# Patient Record
Sex: MALE | Race: WHITE | NOT HISPANIC OR LATINO | Employment: FULL TIME | URBAN - METROPOLITAN AREA
[De-identification: names, ages, dates, MRNs, and addresses within clinical notes are randomized per-mention and may not be internally consistent; named-entity substitution may affect disease eponyms.]

---

## 2017-09-25 ENCOUNTER — PRE VISIT (OUTPATIENT)
Dept: UROLOGY | Facility: CLINIC | Age: 32
End: 2017-09-25

## 2017-09-27 ENCOUNTER — OFFICE VISIT (OUTPATIENT)
Dept: UROLOGY | Facility: CLINIC | Age: 32
End: 2017-09-27
Payer: COMMERCIAL

## 2017-09-27 VITALS
DIASTOLIC BLOOD PRESSURE: 76 MMHG | SYSTOLIC BLOOD PRESSURE: 125 MMHG | OXYGEN SATURATION: 100 % | TEMPERATURE: 97 F | HEART RATE: 48 BPM

## 2017-09-27 DIAGNOSIS — I86.1 BILATERAL VARICOCELES: Primary | ICD-10-CM

## 2017-09-27 PROCEDURE — 99202 OFFICE O/P NEW SF 15 MIN: CPT | Performed by: UROLOGY

## 2017-09-27 ASSESSMENT — PAIN SCALES - GENERAL: PAINLEVEL: NO PAIN (0)

## 2017-09-27 NOTE — PROGRESS NOTES
Pt here for follow-up.  Was last seen August 2014, a little over 3 years ago.  They did IVF and had a child, and she was pregnant Aug 2015.  They did a few IUI prior.    They are here to discuss IVF versus varicocele repair.    Semen analyses x 2 done and results reviewed in depth.  Semen Analysis at [facility]:  3ml,  pH 7.8, 4.3M/cc, 47% progressive, 36% morphology (>30%), Total Progressive Motile Count: 6.06 Million.        Component      Latest Ref Rng & Units 8/15/2014 12/26/2014   Collection Method        Masturbation   Collection Site        [facility] [place] [facility]   Specimen Type        Semen   Lab Receipt Time        816   Time of Analysis        840   Analysis Temp - Centigrade      centigrade  22   Abstinence days      2 - 7 days  6   Liquefied      yes/no  Y   Viscous      yes/no  N   Agglutination      yes/no  N   pH      7.2 pH  7.6   Volume      1.5 ml  3.6   Concentration      15 million/ml  21.5   Total Number      39 million  77.4   Progressive motility      32 %  73   Non-progressive motility      %  2   Immotile      %  25   Total Progressive Motile      15.6 million  56.5   Vitality      58 %  NA   Normal Sperm      4 % normal forms  1 (A)   Abnormal Sperm      morphology  99   Head Defect        99   Midpiece Defect        48   Tail Defect        6   Round Cells      2 million/ml  0.9   WBC      %  NA   Immature Sperm      %  NA   Cell Fragments      %  NA   Consent to Release to Partner        Received 12/26/2014.   Testosterone Total      240 - 950 ng/dL 530    FSH      1.4 - 18.1 IU/L 5.8      Bilateral grade II varicocele, both small grade II.  18cc bilaterally.        He declines a scrotal ultrasound to confirm bilateral varicoceles at this time.  We'll get repeat baseline testing with labs and semen analysis with CAP-score.    I will contact him with results.    Discussed risks, benefits, and alternatives of varix repair, including various methods.  I counseled him extensively on the nature of  varicoceles, and their possible effects on testosterone production and fertility.  I described surgery and embolization approaches, and the detailed risks of surgical repair.  These include damage to artery (ischemia), damage to lymphatics ( hydrocele), as well as risk of recurrence (</= 1%). Discussed that about 2/3rds of men see improved semen parameters and that improvement takes at least 3 months to see. Discussed that if men have varicocele pain, this typically improves after varicocele repair but in rare cases men may have some new testis sensitivity that may be very bothersome.    25min visit, over 50% face to face in counseling/discussion of above issues.

## 2017-09-27 NOTE — MR AVS SNAPSHOT
After Visit Summary   9/27/2017    Pete Valente    MRN: [id number]           Patient Information     Date Of Birth          1985        Visit Information        Provider Department      9/27/2017 2:30 PM Petros Sierra MD [facility]        Today's Diagnoses     Varicocele    -  1      Care Instructions    Semen Analysis (Strict Morph). For the sample the patient needs to abstain from ejaculation for 2-5 days prior, and the sample should be collected in clinic. Ejaculating too frequently can deplete the count whereas abstaining for long periods of time can decrease the number of live sperm. No lubrication, powders, saliva, or intercourse can be used. Partners can be in the room and help produce the sample.     [facility]  [facility]  [address]  [address] [address]  [place], [place] [address] [phone]          Follow-ups after your visit        Your next 10 appointments already scheduled     Oct 19, 2017  8:00 AM CDT   LAB with [facility] LAB   [facility] ([facility])    [address]  [facility] [address] [phone]           Patient must bring picture ID. Patient should be prepared to give a urine specimen  Please do not eat 10-12 hours before your appointment if you are coming in fasting for labs on lipids, cholesterol, or glucose (sugar). Pregnant women should follow their Care Team instructions. Water with medications is okay. Do not drink coffee or other fluids. If you have concerns about taking  your medications, please ask at office or if scheduling via [employer], send a message by clicking on Secure Messaging, Message Your Care Team.              Future tests that were ordered for you today     Open Future Orders        Priority Expected Expires Ordered    Semen Analysis, Strict Morphology (BHAVYA) Routine  9/28/2018 9/27/2017    Testosterone Free and Total Routine  11/27/2017  2017    Follicle stimulating hormone Routine  2017    Estradiol ultrasensitive Routine  2017            Who to contact     If you have questions or need follow up information about today's clinic visit or your schedule please contact [facility] directly at [phone].  Normal or non-critical lab and imaging results will be communicated to you by MyChart, letter or phone within 4 business days after the clinic has received the results. If you do not hear from us within 7 days, please contact the clinic through MyChart or phone. If you have a critical or abnormal lab result, we will notify you by phone as soon as possible.  Submit refill requests through [employer] or call your pharmacy and they will forward the refill request to us. Please allow 3 business days for your refill to be completed.          Additional Information About Your Visit        [employer] Information     [employer] is an electronic gateway that provides easy, online access to your medical records. With [employer], you can request a clinic appointment, read your test results, renew a prescription or communicate with your care team.     To sign up for [employer] visit the website at [email or web address]   You will be asked to enter the access code listed below, as well as some personal information. Please follow the directions to create your username and password.     Your access code is: [email or web address]  Expires: 2017  3:31 PM     Your access code will  in 90 days. If you need help or a new code, please contact your [facility] or call [phone] for assistance.        Care EveryWhere ID     This is your Care EveryWhere ID. This could be used by other organizations to access your [place] medical records  [id number]        Your Vitals Were     Pulse Temperature Pulse Oximetry             48 97  F (36.1  C) (Oral) 100%          Blood Pressure from Last 3  Encounters:   09/27/17 125/76   08/22/16 120/80   01/24/16 142/82    Weight from Last 3 Encounters:   08/22/16 88.5 kg (195 lb)   01/19/16 88.5 kg (195 lb)   06/18/15 88.5 kg (195 lb)               Primary Care Provider    Physician No Ref-Primary       No address on file        Equal Access to Services     [facility] : Hadii aad ku hadasho Soomaali, waaxda luqadaha, qaybta kaalmada adeegyada, waxay idiin hayaan adeeg khtheresash larob . So [facility] [phone].    ATENCIÓN: Si habla español, tiene a benito disposición servicios gratuitos de asistencia lingüística. Llame al [phone].    We comply with applicable federal civil rights laws and [place] laws. We do not discriminate on the basis of race, color, national origin, age, disability sex, sexual orientation or gender identity.            Thank you!     Thank you for choosing [facility] [[email or web address]]  for your care. Our goal is always to provide you with excellent care. Hearing back from our patients is one way we can continue to improve our services. Please take a few minutes to complete the written survey that you may receive in the mail after your visit with us. Thank you!             Your Updated Medication List - Protect others around you: Learn how to safely use, store and throw away your medicines at www.disposemymeds.org.          This list is accurate as of: 9/27/17  3:31 PM.  Always use your most recent med list.                   Brand Name Dispense Instructions for use Diagnosis    ADVIL PO

## 2017-09-27 NOTE — PATIENT INSTRUCTIONS
Semen Analysis (Strict Morph). For the sample the patient needs to abstain from ejaculation for 2-5 days prior, and the sample should be collected in clinic. Ejaculating too frequently can deplete the count whereas abstaining for long periods of time can decrease the number of live sperm. No lubrication, powders, saliva, or intercourse can be used. Partners can be in the room and help produce the sample.     [facility]  [facility]  [address]  [address] [place] [address]  [phone]

## 2017-09-27 NOTE — NURSING NOTE
"Pete Valente's goals for this visit include:   Chief Complaint   Patient presents with     Consult     Varicocele        He requests these members of his care team be copied on today's visit information: NA     Referring Provider:  Referred Self, MD  No address on file    Initial /76 (BP Location: Left arm, Patient Position: Chair, Cuff Size: Adult Regular)  Pulse (!) 48  Temp 97  F (36.1  C) (Oral)  SpO2 100% Estimated body mass index is 25.04 kg/(m^2) as calculated from the following:    Height as of 1/24/16: 1.88 m (6' 2\").    Weight as of 8/22/16: 88.5 kg (195 lb).  BP completed using cuff size: regular        "

## 2017-10-19 LAB — FSH SERPL-ACNC: 6 IU/L (ref 0.7–10.8)

## 2017-10-19 PROCEDURE — 84270 ASSAY OF SEX HORMONE GLOBUL: CPT | Performed by: UROLOGY

## 2017-10-19 PROCEDURE — 82670 ASSAY OF TOTAL ESTRADIOL: CPT | Performed by: UROLOGY

## 2017-10-19 PROCEDURE — 84403 ASSAY OF TOTAL TESTOSTERONE: CPT | Performed by: UROLOGY

## 2017-10-19 PROCEDURE — 36415 COLL VENOUS BLD VENIPUNCTURE: CPT | Performed by: UROLOGY

## 2017-10-19 PROCEDURE — 83001 ASSAY OF GONADOTROPIN (FSH): CPT | Performed by: UROLOGY

## 2017-10-21 LAB
SHBG SERPL-SCNC: 46 NMOL/L (ref 11–80)
TESTOST FREE SERPL-MCNC: 9.22 NG/DL (ref 4.7–24.4)
TESTOST SERPL-MCNC: 538 NG/DL (ref 240–950)

## 2017-10-24 LAB — ESTRADIOL SERPL HS-MCNC: 17 PG/ML (ref 10–40)

## 2017-12-27 ENCOUNTER — TRANSFERRED RECORDS (OUTPATIENT)
Dept: HEALTH INFORMATION MANAGEMENT | Facility: CLINIC | Age: 32
End: 2017-12-27

## 2017-12-27 PROCEDURE — 89322 SEMEN ANAL STRICT CRITERIA: CPT

## 2018-01-02 LAB
ABNORMAL SPERM: 87 MORPHOLOGY
ABSTINENCE DAYS: 4 DAYS (ref 2–7)
AGGLUTINATION: NO YES/NO
ANALYSIS TEMP - CENTIGRADE: 22 CENTIGRADE
CELL FRAGMENTS: NORMAL %
COLLECTION METHOD: NORMAL
COLLECTION SITE: NORMAL
CONSENT TO RELEASE TO PARTNER: YES
HEAD DEFECT: 84
IMMATURE SPERM: NORMAL %
IMMOTILE: 20 %
LAB RECEIPT TIME: NORMAL
LIQUEFIED: YES YES/NO
MIDPIECE DEFECT: 26
NON-PROGRESSIVE MOTILITY: 6 %
NORMAL SPERM: 13 % NORMAL FORMS (ref 4–?)
PROGRESSIVE MOTILITY: 74 % (ref 32–?)
ROUND CELLS: 0.2 MILLION/ML (ref ?–2)
SPECIMEN CONCENTRATION: 30 MILLION/ML (ref 15–?)
SPECIMEN PH: 7.6 PH (ref 7.2–?)
SPECIMEN TYPE: NORMAL
SPECIMEN VOL UR: 3.4 ML (ref 1.5–?)
TAIL DEFECT: 5
TIME OF ANALYSIS: NORMAL
TOTAL NUMBER: 102 MILLION (ref 39–?)
TOTAL PROGRESSIVE MOTILE: 75 MILLION (ref 15.6–?)
VISCOUS: NO YES/NO
VITALITY: NORMAL % (ref 58–?)
WBC SPECIMEN: NORMAL %

## 2019-12-15 ENCOUNTER — HEALTH MAINTENANCE LETTER (OUTPATIENT)
Age: 34
End: 2019-12-15

## 2021-01-15 ENCOUNTER — HEALTH MAINTENANCE LETTER (OUTPATIENT)
Age: 36
End: 2021-01-15

## 2021-10-24 ENCOUNTER — HEALTH MAINTENANCE LETTER (OUTPATIENT)
Age: 36
End: 2021-10-24

## 2022-02-13 ENCOUNTER — HEALTH MAINTENANCE LETTER (OUTPATIENT)
Age: 37
End: 2022-02-13

## 2022-10-16 ENCOUNTER — HEALTH MAINTENANCE LETTER (OUTPATIENT)
Age: 37
End: 2022-10-16

## 2022-12-03 ENCOUNTER — HOSPITAL ENCOUNTER (EMERGENCY)
Facility: CLINIC | Age: 37
Discharge: HOME | End: 2022-12-04
Attending: EMERGENCY MEDICINE | Admitting: EMERGENCY MEDICINE
Payer: COMMERCIAL

## 2022-12-03 DIAGNOSIS — N17.9 ACUTE KIDNEY INJURY (H): ICD-10-CM

## 2022-12-03 DIAGNOSIS — N20.0 KIDNEY STONE: ICD-10-CM

## 2022-12-03 PROCEDURE — 96360 HYDRATION IV INFUSION INIT: CPT

## 2022-12-03 PROCEDURE — 81003 URINALYSIS AUTO W/O SCOPE: CPT | Performed by: EMERGENCY MEDICINE

## 2022-12-03 PROCEDURE — 99284 EMERGENCY DEPT VISIT MOD MDM: CPT | Mod: 25

## 2022-12-04 ENCOUNTER — APPOINTMENT (OUTPATIENT)
Dept: CT IMAGING | Facility: CLINIC | Age: 37
End: 2022-12-04
Attending: EMERGENCY MEDICINE
Payer: COMMERCIAL

## 2022-12-04 VITALS
SYSTOLIC BLOOD PRESSURE: 131 MMHG | OXYGEN SATURATION: 100 % | TEMPERATURE: 99.6 F | RESPIRATION RATE: 20 BRPM | DIASTOLIC BLOOD PRESSURE: 83 MMHG | HEART RATE: 86 BPM

## 2022-12-04 LAB
ALBUMIN UR-MCNC: NEGATIVE MG/DL
ANION GAP SERPL CALCULATED.3IONS-SCNC: 11 MMOL/L (ref 7–15)
APPEARANCE UR: CLEAR
BASOPHILS # BLD AUTO: 0.1 10E3/UL (ref 0–0.2)
BASOPHILS NFR BLD AUTO: 1 %
BILIRUB UR QL STRIP: NEGATIVE
BUN SERPL-MCNC: 20.1 MG/DL (ref 6–20)
CALCIUM SERPL-MCNC: 9.3 MG/DL (ref 8.6–10)
CHLORIDE SERPL-SCNC: 101 MMOL/L (ref 98–107)
COLOR UR AUTO: NORMAL
CREAT SERPL-MCNC: 1.43 MG/DL (ref 0.67–1.17)
DEPRECATED HCO3 PLAS-SCNC: 24 MMOL/L (ref 22–29)
EOSINOPHIL # BLD AUTO: 0.1 10E3/UL (ref 0–0.7)
EOSINOPHIL NFR BLD AUTO: 1 %
ERYTHROCYTE [DISTWIDTH] IN BLOOD BY AUTOMATED COUNT: 11.5 % (ref 10–15)
GFR SERPL CREATININE-BSD FRML MDRD: 65 ML/MIN/1.73M2
GLUCOSE SERPL-MCNC: 116 MG/DL (ref 70–99)
GLUCOSE UR STRIP-MCNC: NEGATIVE MG/DL
HCT VFR BLD AUTO: 43 % (ref 40–53)
HGB BLD-MCNC: 14.4 G/DL (ref 13.3–17.7)
HGB UR QL STRIP: NEGATIVE
IMM GRANULOCYTES # BLD: 0 10E3/UL
IMM GRANULOCYTES NFR BLD: 0 %
KETONES UR STRIP-MCNC: NEGATIVE MG/DL
LEUKOCYTE ESTERASE UR QL STRIP: NEGATIVE
LYMPHOCYTES # BLD AUTO: 1.9 10E3/UL (ref 0.8–5.3)
LYMPHOCYTES NFR BLD AUTO: 21 %
MCH RBC QN AUTO: 28.9 PG (ref 26.5–33)
MCHC RBC AUTO-ENTMCNC: 33.5 G/DL (ref 31.5–36.5)
MCV RBC AUTO: 86 FL (ref 78–100)
MONOCYTES # BLD AUTO: 0.7 10E3/UL (ref 0–1.3)
MONOCYTES NFR BLD AUTO: 8 %
NEUTROPHILS # BLD AUTO: 6.2 10E3/UL (ref 1.6–8.3)
NEUTROPHILS NFR BLD AUTO: 69 %
NITRATE UR QL: NEGATIVE
NRBC # BLD AUTO: 0 10E3/UL
NRBC BLD AUTO-RTO: 0 /100
PH UR STRIP: 5.5 [PH] (ref 5–7)
PLATELET # BLD AUTO: 224 10E3/UL (ref 150–450)
POTASSIUM SERPL-SCNC: 3.6 MMOL/L (ref 3.4–5.3)
RBC # BLD AUTO: 4.98 10E6/UL (ref 4.4–5.9)
RBC URINE: <1 /HPF
SODIUM SERPL-SCNC: 136 MMOL/L (ref 136–145)
SP GR UR STRIP: 1.01 (ref 1–1.03)
UROBILINOGEN UR STRIP-MCNC: NORMAL MG/DL
WBC # BLD AUTO: 8.9 10E3/UL (ref 4–11)
WBC URINE: 1 /HPF

## 2022-12-04 PROCEDURE — 250N000013 HC RX MED GY IP 250 OP 250 PS 637: Performed by: EMERGENCY MEDICINE

## 2022-12-04 PROCEDURE — 36415 COLL VENOUS BLD VENIPUNCTURE: CPT | Performed by: EMERGENCY MEDICINE

## 2022-12-04 PROCEDURE — 80048 BASIC METABOLIC PNL TOTAL CA: CPT | Performed by: EMERGENCY MEDICINE

## 2022-12-04 PROCEDURE — 258N000003 HC RX IP 258 OP 636: Performed by: EMERGENCY MEDICINE

## 2022-12-04 PROCEDURE — 74176 CT ABD & PELVIS W/O CONTRAST: CPT

## 2022-12-04 PROCEDURE — 85025 COMPLETE CBC W/AUTO DIFF WBC: CPT | Performed by: EMERGENCY MEDICINE

## 2022-12-04 RX ORDER — ONDANSETRON 4 MG/1
4 TABLET, ORALLY DISINTEGRATING ORAL EVERY 8 HOURS PRN
Qty: 10 TABLET | Refills: 0 | Status: SHIPPED | OUTPATIENT
Start: 2022-12-04 | End: 2022-12-07

## 2022-12-04 RX ORDER — HYDROCODONE BITARTRATE AND ACETAMINOPHEN 5; 325 MG/1; MG/1
1 TABLET ORAL EVERY 6 HOURS PRN
Qty: 7 TABLET | Refills: 0 | Status: SHIPPED | OUTPATIENT
Start: 2022-12-04 | End: 2022-12-07

## 2022-12-04 RX ORDER — TAMSULOSIN HYDROCHLORIDE 0.4 MG/1
0.4 CAPSULE ORAL ONCE
Status: COMPLETED | OUTPATIENT
Start: 2022-12-04 | End: 2022-12-04

## 2022-12-04 RX ORDER — TAMSULOSIN HYDROCHLORIDE 0.4 MG/1
0.4 CAPSULE ORAL DAILY
Qty: 10 CAPSULE | Refills: 0 | Status: SHIPPED | OUTPATIENT
Start: 2022-12-04 | End: 2022-12-14

## 2022-12-04 RX ADMIN — TAMSULOSIN HYDROCHLORIDE 0.4 MG: 0.4 CAPSULE ORAL at 03:36

## 2022-12-04 RX ADMIN — SODIUM CHLORIDE 1000 ML: 9 INJECTION, SOLUTION INTRAVENOUS at 01:42

## 2022-12-04 ASSESSMENT — ACTIVITIES OF DAILY LIVING (ADL)
ADLS_ACUITY_SCORE: 35
ADLS_ACUITY_SCORE: 35

## 2022-12-04 ASSESSMENT — ENCOUNTER SYMPTOMS
HEMATURIA: 0
FREQUENCY: 0
NAUSEA: 0
VOMITING: 0
FEVER: 0
DYSURIA: 0
FLANK PAIN: 1
ABDOMINAL PAIN: 0

## 2022-12-04 NOTE — ED PROVIDER NOTES
History   Chief Complaint:  Flank Pain       HPI   Pete Valente is a 37 year old male with history of kidney stones presents to the ER for evaluation of right flank pain.  Patient states that pain started on Thursday but have been intermittent.  Friday he felt better but pain was still present.  Tonight, when he would lay down for bed, he felt another sharp pain and decided come to the ER for evaluation.  Reports passing kidney stones on his own in the past without need for intervention.  This feels similar to when he has had kidney stone attacks in the past.  Also had an episode of vomiting on Thursday but none since.  Denies any fever, prior dysuria/urgency/frequency, hematuria, diarrhea/constipation.  He took ibuprofen on Thursday.    Review of Systems   Constitutional: Negative for fever.   Gastrointestinal: Negative for abdominal pain, nausea and vomiting.   Genitourinary: Positive for flank pain. Negative for dysuria, frequency, hematuria and urgency.   All other systems reviewed and are negative.      Allergies:  No Known Allergies    Medications:  HYDROcodone-acetaminophen (NORCO) 5-325 MG tablet  ondansetron (ZOFRAN ODT) 4 MG ODT tab  tamsulosin (FLOMAX) 0.4 MG capsule  Ibuprofen (ADVIL PO)        Past Medical History:     Past Medical History:   Diagnosis Date     Kidney stones      Patient Active Problem List    Diagnosis Date Noted     Appendicitis 01/19/2016     Priority: Medium        Past Surgical History:    Past Surgical History:   Procedure Laterality Date     LAPAROSCOPIC APPENDECTOMY N/A 1/19/2016    Procedure: LAPAROSCOPIC APPENDECTOMY;  Surgeon: Dae Carr MD;  Location:  OR     NO HISTORY OF SURGERY          Family History:    No family history on file.    Social History:  The patient presents to the ED alone.    Physical Exam     Patient Vitals for the past 24 hrs:   BP Temp Temp src Pulse Resp SpO2   12/03/22 2328 (!) 157/97 -- -- -- -- --   12/03/22 2327 -- 98.2  F (36.8  C)  Temporal 86 20 98 %       Physical Exam  General: Alert, no acute distress  HEENT:  Moist mucous membranes. Conjunctiva normal.   CV:  RRR, no m/r/g, skin warm and well perfused  Pulm:  CTAB, no wheezes/ronchi/rales.  No acute distress, breathing comfortably  GI:  Soft, nontender, nondistended.  No rebound or guarding.    MSK:  Moving all extremities.  No focal areas of edema, erythema, or tenderness; no CVA tenderness  Skin:  WWP, no rashes, skin color normal, no diaphoresis    Emergency Department Course       Imaging:  CT Abdomen Pelvis w/o Contrast   Final Result   IMPRESSION:    1.  Mild right hydronephrosis caused by a 5 mm distal ureteral stone.           Report per radiology.     Laboratory:  Labs Ordered and Resulted from Time of ED Arrival to Time of ED Departure   BASIC METABOLIC PANEL - Abnormal       Result Value    Sodium 136      Potassium 3.6      Chloride 101      Carbon Dioxide (CO2) 24      Anion Gap 11      Urea Nitrogen 20.1 (*)     Creatinine 1.43 (*)     Calcium 9.3      Glucose 116 (*)     GFR Estimate 65     ROUTINE UA WITH MICROSCOPIC REFLEX TO CULTURE - Normal    Color Urine Light Yellow      Appearance Urine Clear      Glucose Urine Negative      Bilirubin Urine Negative      Ketones Urine Negative      Specific Gravity Urine 1.009      Blood Urine Negative      pH Urine 5.5      Protein Albumin Urine Negative      Urobilinogen Urine Normal      Nitrite Urine Negative      Leukocyte Esterase Urine Negative      RBC Urine <1      WBC Urine 1     CBC WITH PLATELETS AND DIFFERENTIAL    WBC Count 8.9      RBC Count 4.98      Hemoglobin 14.4      Hematocrit 43.0      MCV 86      MCH 28.9      MCHC 33.5      RDW 11.5      Platelet Count 224      % Neutrophils 69      % Lymphocytes 21      % Monocytes 8      % Eosinophils 1      % Basophils 1      % Immature Granulocytes 0      NRBCs per 100 WBC 0      Absolute Neutrophils 6.2      Absolute Lymphocytes 1.9      Absolute Monocytes 0.7       Absolute Eosinophils 0.1      Absolute Basophils 0.1      Absolute Immature Granulocytes 0.0      Absolute NRBCs 0.0         Procedures  None    Emergency Department Course:      Reviewed:  I reviewed nursing notes, vitals, and past medical history    Assessments:   I performed a physical exam of the patient. Findings as above.      Patient rechecked and updated. Plan of care discussed and questions answered.     Consults:   None      Interventions:  Medications   0.9% sodium chloride BOLUS (1,000 mLs Intravenous New Bag 12/4/22 0142)   tamsulosin (FLOMAX) capsule 0.4 mg (has no administration in time range)       Disposition:  The patient was discharged to home.     Impression & Plan       Covid-19  Pete Valente was evaluated during a global COVID-19 pandemic, which necessitated consideration that the patient might be at risk for infection with the SARS-CoV-2 virus that causes COVID-19.   Applicable protocols for evaluation were followed during the patient's care.       Medical Decision Making:  Pete Valente is a 37 year old male with prior history of kidney stones presenting to the ER for evaluation of right flank pain reminiscent of his prior bouts of renal colic.  Please see above for details of HPI and exam.  Patient is afebrile vitally stable.  Declines pain medications at this time.  Abdominal exam is benign.  Broad differentials considered including renal colic, UTI/pyelonephritis, passed kidney stone, musculoskeletal strain amongst other things.  Given patient's age and exam, doubt intra-abdominal or vascular catastrophe causing her flank pain.  Basic lab studies above show MOISES from previous evaluations, likely prerenal but may also be related to a kidney stone.  UA without signs of infection.  CT confirms 5 mm stone at the distal right ureter causing mild right hydronephrosis.  Patient declines pain medication at this time.  Flomax given here in the ER.  Given his otherwise well appearance, reasonable  clinical certainty I feel that he is safe to discharge home.  Will prescribe Flomax, Zofran, short course of pain medication for severe/breakthrough pain.  Opioid discussion had at bedside.  Also will refer to urology if symptoms persist over the next few days as further intervention may be required.  We will have patient follow-up with PCP next week for repeat kidney function testing and he is agreeable with this.  Discussed reasons to return to the ER.  All questions were answered prior to patient discharge.        Diagnosis:    ICD-10-CM    1. Kidney stone  N20.0       2. Acute kidney injury (H)  N17.9           Discharge Medications:  New Prescriptions    HYDROCODONE-ACETAMINOPHEN (NORCO) 5-325 MG TABLET    Take 1 tablet by mouth every 6 hours as needed for severe pain (7-10)    ONDANSETRON (ZOFRAN ODT) 4 MG ODT TAB    Take 1 tablet (4 mg) by mouth every 8 hours as needed for nausea    TAMSULOSIN (FLOMAX) 0.4 MG CAPSULE    Take 1 capsule (0.4 mg) by mouth daily for 10 doses       Scribe Disclosure:  Vito ZEPEDA MD, am serving as a scribe at 11:49 PM on 12/3/2022 to document services personally performed by Vito Babcock MD based on my observations and the provider's statements to me.     [facility]         Vito Babcock MD  12/04/22 8055

## 2022-12-04 NOTE — DISCHARGE INSTRUCTIONS
Please follow up with your primary care doctor in 1 week for repeat kidney function testing.       Discharge Instructions  Kidney Stones    Kidney stones are a common problem that can cause a lot of pain but fortunately are usually not dangerous. Kidney stones form in the kidney and then can cause a blockage (obstruction) of the flow of urine from the kidney which leads to pain. Most patients can manage kidney stones at home (without a hospital stay).  However, sometimes your condition may be worse than it seemed at first, or may get worse with time. Most kidney stones will pass on their own, but occasionally stones may need to be removed by an urologist.    Generally, every Emergency Department visit should have a follow-up clinic visit with either a primary or a specialty clinic/provider. Please follow-up as instructed by your emergency provider today.      Return to the Emergency Department if:  Your pain is not controlled despite the medications provided or recommended.  You are vomiting (throwing up) and cannot keep fluids or medications down.  You develop a fever (>100.4 F).  You feel much more ill or develop new symptoms.  What can I do to help myself?  Be sure to drink plenty of fluids.  If instructed to do so, strain your urine (pee) with the urine strainer you were provided with today. Your stone may look like a grain of sand or a small pebble. Collect any stones in the cup provided and bring to your follow-up appointment.  Staying active is good, and may help the stone to pass. You may do whatever you feel up to doing without restrictions.   Treatment:  Non-steroidal anti-inflammatory drugs (NSAIDs). This includes prescription medicines like Toradol  (ketorolac) and non-prescription medicines like Advil  (ibuprofen) and Nuprin  (ibuprofen) and Naproxen. These pain relievers are very effective for kidney stones.  Nausea (sick to your stomach) medication.  Nausea and vomiting are common with kidney stones,  so your provider may send you home with medicine for this.   Flomax  (tamsulosin). This medicine is sometimes used for men with prostate problems, but also can help kidney stones to pass. Its effectiveness is controversial or questionable so it is prescribed in certain situations. This medicine can lower blood pressure, and you may feel faint/lightheaded, especially when you first stand up. Be sure to get up gradually, sit down if you feel faint, and avoid activity where feeling faint would be dangerous, such as climbing ladders.  If you were given a prescription for medicine here today, be sure to read all of the information (including the package insert) that comes with your prescription.  This will include important information about the medicine, its side effects, and any warnings that you need to know about.  The pharmacist who fills the prescription can provide more information and answer questions you may have about the medicine.  If you have questions or concerns that the pharmacist cannot address, please call or return to the Emergency Department.   Remember that you can always come back to the Emergency Department if you are not able to see your regular provider in the amount of time listed above, if you get any new symptoms, or if there is anything that worries you.        Opioid Medication Information    You have been given a prescription for an opioid (narcotic) pain medicine and/or have received a pain medicine while here in the Emergency Department. These medicines can make you drowsy or impaired. You must not drive, operate dangerous equipment, or engage in any other dangerous activities while taking these medications. If you drive while taking these medications, you could be arrested for driving under the influence (DUI). Do not drink any alcohol while you are taking these medications.     Opioid pain medications can cause addiction. If you have a history of chemical dependency of any type, you are  at a higher risk of becoming addicted to pain medications.  Only take these prescribed medications to treat your pain when all other options have been tried. Take it for as short a time and as few doses as possible. Store your pain pills in a secure place, as they are frequently stolen and provide a dangerous opportunity for children or visitors in your house to start abusing these powerful medications. We will not replace any lost or stolen medicine.    If you do not finish your medication, it is a good idea to get rid of it but please do not flush it down the toilet. Please dispose of the remaining medication at a local pharmacy or law enforcement facility. The [place] [employer]ution [employer] has additional information on medication disposal: https://www.pca.[email or web address].[place]/living-green/managing-unwanted-medications.      Many prescription pain medications contain Tylenol  (acetaminophen), including Vicodin , Tylenol #3 , Norco , Lortab , and Percocet .  You should not take any extra pills of Tylenol  if you are using these prescription medications or you can get very sick.  Do not ever take more than 3000 mg of acetaminophen in any 24 hour period.    All opioids tend to cause constipation. Drink plenty of water and eat foods that have a lot of fiber, such as fruits, vegetables, prune juice, apple juice and high fiber cereal.  Take a laxative if you don t move your bowels at least every other day. Miralax , Milk of Magnesia, Colace , or Senna  can be used to keep you regular.

## 2022-12-04 NOTE — ED TRIAGE NOTES
Pt arrives to ED with R flank pain since Thursday, better and now worse again. Hx kidney stones. N/V.

## 2022-12-06 ENCOUNTER — HOSPITAL ENCOUNTER (EMERGENCY)
Facility: CLINIC | Age: 37
Discharge: HOME | End: 2022-12-06
Attending: EMERGENCY MEDICINE | Admitting: EMERGENCY MEDICINE
Payer: COMMERCIAL

## 2022-12-06 VITALS
TEMPERATURE: 98.4 F | RESPIRATION RATE: 16 BRPM | SYSTOLIC BLOOD PRESSURE: 141 MMHG | OXYGEN SATURATION: 99 % | HEART RATE: 78 BPM | DIASTOLIC BLOOD PRESSURE: 74 MMHG

## 2022-12-06 DIAGNOSIS — N20.1 URETERAL STONE: ICD-10-CM

## 2022-12-06 LAB
ALBUMIN UR-MCNC: NEGATIVE MG/DL
ANION GAP SERPL CALCULATED.3IONS-SCNC: 10 MMOL/L (ref 7–15)
APPEARANCE UR: CLEAR
BASOPHILS # BLD AUTO: 0 10E3/UL (ref 0–0.2)
BASOPHILS NFR BLD AUTO: 0 %
BILIRUB UR QL STRIP: NEGATIVE
BUN SERPL-MCNC: 11.4 MG/DL (ref 6–20)
CALCIUM SERPL-MCNC: 9.5 MG/DL (ref 8.6–10)
CHLORIDE SERPL-SCNC: 102 MMOL/L (ref 98–107)
COLOR UR AUTO: ABNORMAL
CREAT SERPL-MCNC: 1 MG/DL (ref 0.67–1.17)
DEPRECATED HCO3 PLAS-SCNC: 27 MMOL/L (ref 22–29)
EOSINOPHIL # BLD AUTO: 0.1 10E3/UL (ref 0–0.7)
EOSINOPHIL NFR BLD AUTO: 1 %
ERYTHROCYTE [DISTWIDTH] IN BLOOD BY AUTOMATED COUNT: 11.5 % (ref 10–15)
GFR SERPL CREATININE-BSD FRML MDRD: >90 ML/MIN/1.73M2
GLUCOSE SERPL-MCNC: 90 MG/DL (ref 70–99)
GLUCOSE UR STRIP-MCNC: NEGATIVE MG/DL
HCT VFR BLD AUTO: 42.1 % (ref 40–53)
HGB BLD-MCNC: 14 G/DL (ref 13.3–17.7)
HGB UR QL STRIP: ABNORMAL
HYALINE CASTS: 1 /LPF
IMM GRANULOCYTES # BLD: 0 10E3/UL
IMM GRANULOCYTES NFR BLD: 0 %
KETONES UR STRIP-MCNC: 20 MG/DL
LEUKOCYTE ESTERASE UR QL STRIP: ABNORMAL
LYMPHOCYTES # BLD AUTO: 2 10E3/UL (ref 0.8–5.3)
LYMPHOCYTES NFR BLD AUTO: 28 %
MCH RBC QN AUTO: 28.9 PG (ref 26.5–33)
MCHC RBC AUTO-ENTMCNC: 33.3 G/DL (ref 31.5–36.5)
MCV RBC AUTO: 87 FL (ref 78–100)
MONOCYTES # BLD AUTO: 0.4 10E3/UL (ref 0–1.3)
MONOCYTES NFR BLD AUTO: 6 %
MUCOUS THREADS #/AREA URNS LPF: PRESENT /LPF
NEUTROPHILS # BLD AUTO: 4.5 10E3/UL (ref 1.6–8.3)
NEUTROPHILS NFR BLD AUTO: 65 %
NITRATE UR QL: NEGATIVE
NRBC # BLD AUTO: 0 10E3/UL
NRBC BLD AUTO-RTO: 0 /100
PH UR STRIP: 5 [PH] (ref 5–7)
PLATELET # BLD AUTO: 247 10E3/UL (ref 150–450)
POTASSIUM SERPL-SCNC: 3.6 MMOL/L (ref 3.4–5.3)
RBC # BLD AUTO: 4.85 10E6/UL (ref 4.4–5.9)
RBC URINE: 2 /HPF
SODIUM SERPL-SCNC: 139 MMOL/L (ref 136–145)
SP GR UR STRIP: 1.02 (ref 1–1.03)
UROBILINOGEN UR STRIP-MCNC: NORMAL MG/DL
WBC # BLD AUTO: 7 10E3/UL (ref 4–11)
WBC CLUMPS #/AREA URNS HPF: PRESENT /HPF
WBC URINE: 8 /HPF

## 2022-12-06 PROCEDURE — 36415 COLL VENOUS BLD VENIPUNCTURE: CPT | Performed by: EMERGENCY MEDICINE

## 2022-12-06 PROCEDURE — 85025 COMPLETE CBC W/AUTO DIFF WBC: CPT | Performed by: EMERGENCY MEDICINE

## 2022-12-06 PROCEDURE — 96374 THER/PROPH/DIAG INJ IV PUSH: CPT

## 2022-12-06 PROCEDURE — 81001 URINALYSIS AUTO W/SCOPE: CPT | Performed by: EMERGENCY MEDICINE

## 2022-12-06 PROCEDURE — 250N000011 HC RX IP 250 OP 636: Performed by: EMERGENCY MEDICINE

## 2022-12-06 PROCEDURE — 87086 URINE CULTURE/COLONY COUNT: CPT | Performed by: EMERGENCY MEDICINE

## 2022-12-06 PROCEDURE — 80048 BASIC METABOLIC PNL TOTAL CA: CPT | Performed by: EMERGENCY MEDICINE

## 2022-12-06 PROCEDURE — 99284 EMERGENCY DEPT VISIT MOD MDM: CPT | Mod: 25

## 2022-12-06 RX ORDER — KETOROLAC TROMETHAMINE 15 MG/ML
15 INJECTION, SOLUTION INTRAMUSCULAR; INTRAVENOUS ONCE
Status: COMPLETED | OUTPATIENT
Start: 2022-12-06 | End: 2022-12-06

## 2022-12-06 RX ADMIN — KETOROLAC TROMETHAMINE 15 MG: 15 INJECTION, SOLUTION INTRAMUSCULAR; INTRAVENOUS at 12:45

## 2022-12-06 ASSESSMENT — ENCOUNTER SYMPTOMS
FEVER: 0
FLANK PAIN: 1

## 2022-12-06 ASSESSMENT — ACTIVITIES OF DAILY LIVING (ADL): ADLS_ACUITY_SCORE: 35

## 2022-12-06 NOTE — DISCHARGE INSTRUCTIONS
I recommend 1000 mg of Tylenol every 6 hours along with 600 mg of ibuprofen every 6 hours for your pain.  He should drink plenty of fluids to stay well-hydrated, you should return here should he develop severe nausea to the point you are unable to eat or drink, have severe pain despite taking the Tylenol and ibuprofen or develop fever.  Otherwise, I did place a referral to urology who should call you to make an appointment.

## 2022-12-06 NOTE — ED TRIAGE NOTES
Pt seen Sat and dx with R sided kidney stones. States unable to pass at home. Last tylenol 0700. No vomiting. ABC intact.

## 2022-12-06 NOTE — ED PROVIDER NOTES
History   Chief Complaint:  Flank pain     HPI   Pete Valente is a 37 year old male who presents with intermittent right flank pain. He was seen on 12/03/22 at [facility] ED and CT confirmed 5 mm stone at the distal right ureter causing mild right hydronephrosis. He states that his pain was intermittent yesterday but had completely resolved later in the day. This morning his pain returned again. He has been taking tylenol for pain, and has not taken ibuprofen. Denies fever.    Review of Systems   Constitutional: Negative for fever.   Genitourinary: Positive for flank pain (right).   All other systems reviewed and are negative.      Allergies:  The patient has no known allergies.     Medications:  Flomax  Zofran  Norco    Past Medical History:     Kidney stones  Appendicitis  ORA  Chronic bilateral low back pain without sciatica  Mixed hyperlipidemia    Past Surgical History:    Laparoscopic appendectomy    Social History:  The patient presents to the ED alone.  PCP: No Ref-Primary, Physician       Physical Exam     Patient Vitals for the past 24 hrs:   BP Temp Temp src Pulse Resp SpO2   12/06/22 0947 -- -- -- 78 -- --   12/06/22 0943 (!) 141/74 98.4  F (36.9  C) Temporal -- 16 99 %       Physical Exam  Constitutional: Alert, attentive, GCS 15   Eyes: EOM are normal, anicteric, conjugate gaze  CV: distal extremities warm, well perfused  Chest: Non-labored breathing on RA  GI:  non tender. No distension. No guarding or rebound.    Neurological: Alert, attentive, moving all extremities equally.   Skin: Skin is warm and dry.        Emergency Department Course     Laboratory:  Labs Ordered and Resulted from Time of ED Arrival to Time of ED Departure   ROUTINE UA WITH MICROSCOPIC - Abnormal       Result Value    Color Urine Light Yellow      Appearance Urine Clear      Glucose Urine Negative      Bilirubin Urine Negative      Ketones Urine 20 (*)     Specific Gravity Urine 1.016      Blood Urine Small (*)     pH Urine 5.0       Protein Albumin Urine Negative      Urobilinogen Urine Normal      Nitrite Urine Negative      Leukocyte Esterase Urine Moderate (*)     WBC Clumps Urine Present (*)     Mucus Urine Present (*)     RBC Urine 2      WBC Urine 8 (*)     Hyaline Casts Urine 1     BASIC METABOLIC PANEL - Normal    Sodium 139      Potassium 3.6      Chloride 102      Carbon Dioxide (CO2) 27      Anion Gap 10      Urea Nitrogen 11.4      Creatinine 1.00      Calcium 9.5      Glucose 90      GFR Estimate >90     CBC WITH PLATELETS AND DIFFERENTIAL    WBC Count 7.0      RBC Count 4.85      Hemoglobin 14.0      Hematocrit 42.1      MCV 87      MCH 28.9      MCHC 33.3      RDW 11.5      Platelet Count 247      % Neutrophils 65      % Lymphocytes 28      % Monocytes 6      % Eosinophils 1      % Basophils 0      % Immature Granulocytes 0      NRBCs per 100 WBC 0      Absolute Neutrophils 4.5      Absolute Lymphocytes 2.0      Absolute Monocytes 0.4      Absolute Eosinophils 0.1      Absolute Basophils 0.0      Absolute Immature Granulocytes 0.0      Absolute NRBCs 0.0     URINE CULTURE        Emergency Department Course:         Reviewed:  I reviewed nursing notes, vitals, past medical history and Care Everywhere    Assessments:  1220 I obtained history and examined the patient as noted above.   1324 I rechecked the patient and explained findings.     Interventions:  1245 Toradol 15 mg IV    Disposition:  The patient was discharged to home.     Impression & Plan     Medical Decision Makin-year-old male past medical history significant for kidney stones with known 5 mm right UVJ presenting for recurrence of pain.  He is only taken a single dose of Tylenol in the last 24 hours, he declined pain medications at time of his evaluation in the ER 2 days ago.  His kidney function is stable here, UA is not suggestive of UTI.  He was pain-free even prior to his torso Toradol.  I recommended continued trial of home management given significant  amount of additional pain medication he can take.  I recommended thousand of Tylenol every 6, 600 mg of ibuprofen every 6 and he again declined pain medication.  I did put in a referral to urology, return precautions reviewed.      Diagnosis:    ICD-10-CM    1. Ureteral stone  N20.1 Adult Urology  Referral          Deep Malhotra MD  [employer]  1:32 PM 12/06/22     Scribe Disclosure:  I, Cici Farris, am serving as a scribe at 12:32 PM on 12/6/2022 to document services personally performed by Deep Malhotra MD based on my observations and the provider's statements to me.              Deep Malhotra MD  12/06/22 [id number]

## 2022-12-07 NOTE — RESULT ENCOUNTER NOTE
[facility] Emergency Dept discharge antibiotic (if prescribed): None  No changes in treatment per [facility] ED Lab Result Urine culture protocol.

## 2022-12-08 LAB — BACTERIA UR CULT: NO GROWTH

## 2022-12-08 NOTE — RESULT ENCOUNTER NOTE
"Final urine culture report shows \"NO GROWTH\" and is NEGATIVE.  [facility] Emergency Dept discharge antibiotic: None  Recommendations in treatment per [facility] ED Lab result Urine culture protocol.  "

## 2023-03-26 ENCOUNTER — HEALTH MAINTENANCE LETTER (OUTPATIENT)
Age: 38
End: 2023-03-26

## 2024-06-01 ENCOUNTER — HEALTH MAINTENANCE LETTER (OUTPATIENT)
Age: 39
End: 2024-06-01

## 2025-06-14 ENCOUNTER — HEALTH MAINTENANCE LETTER (OUTPATIENT)
Age: 40
End: 2025-06-14